# Patient Record
Sex: FEMALE | Race: BLACK OR AFRICAN AMERICAN | NOT HISPANIC OR LATINO | URBAN - METROPOLITAN AREA
[De-identification: names, ages, dates, MRNs, and addresses within clinical notes are randomized per-mention and may not be internally consistent; named-entity substitution may affect disease eponyms.]

---

## 2024-03-30 ENCOUNTER — HOSPITAL ENCOUNTER (EMERGENCY)
Facility: HOSPITAL | Age: 44
Discharge: HOME | End: 2024-03-30
Payer: COMMERCIAL

## 2024-03-30 VITALS
OXYGEN SATURATION: 98 % | SYSTOLIC BLOOD PRESSURE: 146 MMHG | BODY MASS INDEX: 42.68 KG/M2 | TEMPERATURE: 99.5 F | HEIGHT: 64 IN | RESPIRATION RATE: 18 BRPM | WEIGHT: 250 LBS | HEART RATE: 90 BPM | DIASTOLIC BLOOD PRESSURE: 100 MMHG

## 2024-03-30 DIAGNOSIS — J06.9 UPPER RESPIRATORY TRACT INFECTION, UNSPECIFIED TYPE: Primary | ICD-10-CM

## 2024-03-30 LAB
FLUAV RNA RESP QL NAA+PROBE: NOT DETECTED
FLUBV RNA RESP QL NAA+PROBE: NOT DETECTED
S PYO DNA THROAT QL NAA+PROBE: NOT DETECTED
SARS-COV-2 RNA RESP QL NAA+PROBE: NOT DETECTED

## 2024-03-30 PROCEDURE — 2500000004 HC RX 250 GENERAL PHARMACY W/ HCPCS (ALT 636 FOR OP/ED): Performed by: PHYSICIAN ASSISTANT

## 2024-03-30 PROCEDURE — 2500000001 HC RX 250 WO HCPCS SELF ADMINISTERED DRUGS (ALT 637 FOR MEDICARE OP): Performed by: PHYSICIAN ASSISTANT

## 2024-03-30 PROCEDURE — 87636 SARSCOV2 & INF A&B AMP PRB: CPT | Performed by: GENERAL PRACTICE

## 2024-03-30 PROCEDURE — 99283 EMERGENCY DEPT VISIT LOW MDM: CPT

## 2024-03-30 PROCEDURE — 87651 STREP A DNA AMP PROBE: CPT | Performed by: GENERAL PRACTICE

## 2024-03-30 RX ORDER — AMOXICILLIN 500 MG/1
500 CAPSULE ORAL ONCE
Status: COMPLETED | OUTPATIENT
Start: 2024-03-30 | End: 2024-03-30

## 2024-03-30 RX ORDER — AMOXICILLIN 500 MG/1
500 CAPSULE ORAL 2 TIMES DAILY
Qty: 20 CAPSULE | Refills: 0 | Status: SHIPPED | OUTPATIENT
Start: 2024-03-30 | End: 2024-04-09

## 2024-03-30 RX ADMIN — AMOXICILLIN 500 MG: 500 CAPSULE ORAL at 21:57

## 2024-03-30 RX ADMIN — DEXAMETHASONE 10 MG: 6 TABLET ORAL at 21:57

## 2024-03-30 ASSESSMENT — PAIN SCALES - GENERAL: PAINLEVEL_OUTOF10: 9

## 2024-03-30 ASSESSMENT — COLUMBIA-SUICIDE SEVERITY RATING SCALE - C-SSRS
1. IN THE PAST MONTH, HAVE YOU WISHED YOU WERE DEAD OR WISHED YOU COULD GO TO SLEEP AND NOT WAKE UP?: NO
6. HAVE YOU EVER DONE ANYTHING, STARTED TO DO ANYTHING, OR PREPARED TO DO ANYTHING TO END YOUR LIFE?: NO
2. HAVE YOU ACTUALLY HAD ANY THOUGHTS OF KILLING YOURSELF?: NO

## 2024-03-30 ASSESSMENT — PAIN DESCRIPTION - DESCRIPTORS: DESCRIPTORS: PRESSURE

## 2024-03-30 ASSESSMENT — PAIN DESCRIPTION - FREQUENCY: FREQUENCY: CONSTANT/CONTINUOUS

## 2024-03-30 ASSESSMENT — PAIN - FUNCTIONAL ASSESSMENT: PAIN_FUNCTIONAL_ASSESSMENT: 0-10

## 2024-03-31 NOTE — ED PROVIDER NOTES
EMERGENCY MEDICINE EVALUATION NOTE    History of Present Illness     Chief Complaint:   Chief Complaint   Patient presents with    Flu Symptoms     Pt to ED for sore throat, bilateral ear pressure, headache, congestion for the past 2 days. Pt took sudafed this am.        HPI: Evelyn Oliver is a 44 y.o. female presents with a chief complaint of upper respiratory-like symptoms.  She reports that she been having some ear pressure as well as a low bit of a headache and congestion for the last few days.  Patient reports that her symptoms were started about a week ago but they got worse over the last 2 days.  She reports that she did not take anything for symptoms a week ago but he started taking Sudafed this morning.  She is not helping her symptoms.  She reports that she had finals for school so she wanted to get to those before coming to the ED.  Patient denies any associated chest pain or shortness of breath.  Patient denies any fevers.  Patient presents concern for strep throat as her throat is sore as well.    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  No Known Allergies  Current Outpatient Medications   Medication Instructions    amoxicillin (AMOXIL) 500 mg, oral, 2 times daily       Physical Exam     Appearance: Alert, oriented , cooperative,  in no acute distress.      Skin: Intact,  dry skin, no lesions, rash, petechiae or purpura.      Eyes: PERRLA, EOMs intact,  Conjunctiva pink      ENT: Hearing grossly intact. Pharynx erythema with white patches, uvula midline with no signs of PTA.     Neck: Supple. Trachea at midline.      Pulmonary: Clear bilaterally. No rales, rhonchi or wheezing. No accessory muscle use or stridor.     Cardiac: Normal rate and  rhythm without murmur     Abdomen: Soft, nontender, active bowel sounds.     Musculoskeletal: Full range of motion.      Neurological:Cranial nerves II through XII are grossly intact, normal sensation, no weakness, no focal findings identified.     Results     Labs Reviewed   GROUP A STREPTOCOCCUS, PCR - Normal       Result Value    Group A Strep PCR Not Detected     SARS-COV-2 AND INFLUENZA A/B PCR - Normal    Flu A Result Not Detected      Flu B Result Not Detected      Coronavirus 2019, PCR Not Detected      Narrative:     This assay has received FDA Emergency Use Authorization (EUA) and  is only authorized for the duration of time that circumstances exist to justify the authorization of the emergency use of in vitro diagnostic tests for the detection of SARS-CoV-2 virus and/or diagnosis of COVID-19 infection under section 564(b)(1) of the Act, 21 U.S.C. 360bbb-3(b)(1). Testing for SARS-CoV-2 is only recommended for patients who meet current clinical and/or epidemiological criteria as defined by federal, state, or local public health directives. This assay is an in vitro diagnostic nucleic acid amplification test for the qualitative detection of SARS-CoV-2, Influenza A, and Influenza B from nasopharyngeal specimens and has been validated for use at [facility]. Negative results do not preclude COVID-19 infections or Influenza A/B infections, and should not be used as the sole basis for diagnosis, treatment, or other management decisions. If Influenza A/B and RSV PCR results are negative, testing for Parainfluenza virus, Adenovirus and Metapneumovirus is routinely performed for [facility] pediatric oncology and intensive care inpatients, and is available on other patients by placing an add-on request.      No orders to display         ED Course & Medical Decision Making     Medications   dexAMETHasone (Decadron) tablet 10 mg (10 mg oral Given 3/30/24 9185)   amoxicillin (Amoxil) capsule 500 mg (500  "mg oral Given 3/30/24 2157)     Heart Rate:  [90]   Temperature:  [37.5 °C (99.5 °F)]   Respirations:  [18]   BP: (146)/(100)   Height:  [162.6 cm (5' 4\")]   Weight:  [113 kg (250 lb)]   Pulse Ox:  [98 %]    ED Course as of 03/30/24 2208   Sat Mar 30, 2024   2149 Updated the patient on the results.  Patient swabs were all negative.  Patient was negative for COVID as well as influenza and strep was negative however patient's exam is consistent with potential pharyngitis.  Patient does have enlarged tonsils bilaterally they are not kissing with a uvula at midline.  There are white patches bilaterally.  Due to there being white patches patient will be treated with antibiotics and Decadron.  Patient be given a dose of Decadron as well as a dose of amoxicillin prior to discharge will be discharged home with amoxicillin.  Patient instructed to follow-up with her primary care provider 1 to 2 days return here immediately with any worsening symptoms. [CJ]      ED Course User Index  [CJ] Cheng Terrazas PA-C         Diagnoses as of 03/30/24 2208   Upper respiratory tract infection, unspecified type       Procedures   Procedures    Diagnosis     1. Upper respiratory tract infection, unspecified type        Disposition   Discharged     ED Prescriptions       Medication Sig Dispense Start Date End Date Auth. Provider    amoxicillin (Amoxil) 500 mg capsule Take 1 capsule (500 mg) by mouth 2 times a day for 10 days. 20 capsule 3/30/2024 4/9/2024 Cheng Terrazas PA-C            Disclaimer: This note was dictated by speech recognition. Minor errors in transcription may be present. Please call if questions.       Cheng Terrazas PA-C  03/30/24 2214    "

## 2024-03-31 NOTE — ED TRIAGE NOTES
Pt to ED for sore throat, bilateral ear pressure, headache, congestion for the past 2 days. Pt took sudafed this am.

## 2024-12-24 ENCOUNTER — APPOINTMENT (OUTPATIENT)
Dept: URGENT CARE | Age: 44
End: 2024-12-24
Payer: COMMERCIAL

## 2025-04-16 ENCOUNTER — OFFICE VISIT (OUTPATIENT)
Dept: URGENT CARE | Age: 45
End: 2025-04-16
Payer: COMMERCIAL

## 2025-04-16 VITALS
RESPIRATION RATE: 20 BRPM | SYSTOLIC BLOOD PRESSURE: 142 MMHG | BODY MASS INDEX: 42.91 KG/M2 | WEIGHT: 250 LBS | DIASTOLIC BLOOD PRESSURE: 85 MMHG | OXYGEN SATURATION: 97 % | HEART RATE: 75 BPM | TEMPERATURE: 98.1 F

## 2025-04-16 DIAGNOSIS — R03.0 ELEVATED BLOOD PRESSURE READING: ICD-10-CM

## 2025-04-16 DIAGNOSIS — L03.011 PARONYCHIA OF FINGER OF RIGHT HAND: ICD-10-CM

## 2025-04-16 DIAGNOSIS — L03.011 PARONYCHIA OF FINGER OF RIGHT HAND: Primary | ICD-10-CM

## 2025-04-16 RX ORDER — FLUCONAZOLE 150 MG/1
150 TABLET ORAL ONCE
Qty: 1 TABLET | Refills: 0 | Status: SHIPPED | OUTPATIENT
Start: 2025-04-16 | End: 2025-04-16 | Stop reason: SDUPTHER

## 2025-04-16 RX ORDER — DOXYCYCLINE 100 MG/1
100 CAPSULE ORAL 2 TIMES DAILY
Qty: 14 CAPSULE | Refills: 0 | Status: SHIPPED | OUTPATIENT
Start: 2025-04-16 | End: 2025-04-16 | Stop reason: SDUPTHER

## 2025-04-16 RX ORDER — MUPIROCIN 20 MG/G
OINTMENT TOPICAL 3 TIMES DAILY
Qty: 22 G | Refills: 0 | Status: SHIPPED | OUTPATIENT
Start: 2025-04-16 | End: 2025-04-16 | Stop reason: SDUPTHER

## 2025-04-16 RX ORDER — FLUCONAZOLE 150 MG/1
150 TABLET ORAL ONCE
Qty: 1 TABLET | Refills: 0 | Status: SHIPPED | OUTPATIENT
Start: 2025-04-16 | End: 2025-04-16

## 2025-04-16 RX ORDER — DOXYCYCLINE 100 MG/1
100 CAPSULE ORAL 2 TIMES DAILY
Qty: 14 CAPSULE | Refills: 0 | Status: SHIPPED | OUTPATIENT
Start: 2025-04-16 | End: 2025-04-23

## 2025-04-16 RX ORDER — MUPIROCIN 20 MG/G
OINTMENT TOPICAL 3 TIMES DAILY
Qty: 22 G | Refills: 0 | Status: SHIPPED | OUTPATIENT
Start: 2025-04-16 | End: 2025-04-26

## 2025-04-16 ASSESSMENT — PAIN SCALES - GENERAL: PAINLEVEL_OUTOF10: 8

## 2025-04-16 NOTE — PROGRESS NOTES
HPI:  Patient states that she accidentally poked her right 2nd finger with a fish bone about 5 days ago and noticed swelling/pain next to nailbed 3 days ago.  No fever/chills.  No numbness/weakness.  Pt is right handed.  Last tetanus with a year.       ROS:  No numbness  No weakness  No loss of movement  No fever/chills     PE:    A&O x3  NCAT  No conjunctival erythema  Normal respiratory effort  From in the right 2nd finger  +swelling/erythema/tndop proximal to right 2nd finger nailbed w/o fluctuance or discharge  No vascular deficit  No focal deficit  Judgement normal      A/P:   Paronychia right 2nd finger  Elevated blood pressure reading    Warm compress to the area 10-15 minutes a few times per day.  Take Tylenol and Motrin as needed for pain. Eat yogurt and take probiotics when on medication.  Keep a diary of symptoms.  Recheck in 3 days if no improvement as it might be ready for I&D if needed.   Go to the ER if starts getting worse.     Monitor blood pressure at home and recheck with your doctor if remains elevated.

## 2025-04-16 NOTE — LETTER
April 16, 2025     Patient: Evelyn Oliver   YOB: 1980   Date of Visit: 4/16/2025       To Whom It May Concern:    Evelyn Oliver was seen in my clinic on 4/16/2025 at 3:10 pm. Please excuse Evelyn for her absence from work on this day to make the appointment. Evelyn can return back to work on 4/21/25.    If you have any questions or concerns, please don't hesitate to call.         Sincerely,         Fern Bassett MD        CC: No Recipients